# Patient Record
Sex: MALE | HISPANIC OR LATINO | URBAN - METROPOLITAN AREA
[De-identification: names, ages, dates, MRNs, and addresses within clinical notes are randomized per-mention and may not be internally consistent; named-entity substitution may affect disease eponyms.]

---

## 2022-08-15 ENCOUNTER — HOSPITAL ENCOUNTER (EMERGENCY)
Facility: HOSPITAL | Age: 37
Discharge: HOME | End: 2022-08-15
Attending: EMERGENCY MEDICINE | Admitting: EMERGENCY MEDICINE

## 2022-08-15 VITALS
SYSTOLIC BLOOD PRESSURE: 142 MMHG | TEMPERATURE: 97.5 F | HEART RATE: 76 BPM | OXYGEN SATURATION: 99 % | HEIGHT: 66 IN | RESPIRATION RATE: 15 BRPM | DIASTOLIC BLOOD PRESSURE: 99 MMHG

## 2022-08-15 DIAGNOSIS — H57.89 IRRITATION OF LEFT EYE: Primary | ICD-10-CM

## 2022-08-15 PROCEDURE — 25010000002 TETANUS-DIPHTH-ACELL PERTUSSIS 5-2.5-18.5 LF-MCG/0.5 SUSPENSION PREFILLED SYRINGE: Performed by: PHYSICIAN ASSISTANT

## 2022-08-15 PROCEDURE — 90471 IMMUNIZATION ADMIN: CPT | Performed by: PHYSICIAN ASSISTANT

## 2022-08-15 PROCEDURE — 99283 EMERGENCY DEPT VISIT LOW MDM: CPT

## 2022-08-15 PROCEDURE — 90715 TDAP VACCINE 7 YRS/> IM: CPT | Performed by: PHYSICIAN ASSISTANT

## 2022-08-15 RX ORDER — PROPARACAINE HYDROCHLORIDE 5 MG/ML
2 SOLUTION/ DROPS OPHTHALMIC ONCE
Status: COMPLETED | OUTPATIENT
Start: 2022-08-15 | End: 2022-08-15

## 2022-08-15 RX ORDER — KETOTIFEN FUMARATE 0.35 MG/ML
1 SOLUTION/ DROPS OPHTHALMIC 2 TIMES DAILY
Qty: 10 ML | Refills: 0 | Status: SHIPPED | OUTPATIENT
Start: 2022-08-15

## 2022-08-15 RX ORDER — ERYTHROMYCIN 5 MG/G
OINTMENT OPHTHALMIC
Qty: 3.5 G | Refills: 0 | Status: SHIPPED | OUTPATIENT
Start: 2022-08-15

## 2022-08-15 RX ADMIN — TETANUS TOXOID, REDUCED DIPHTHERIA TOXOID AND ACELLULAR PERTUSSIS VACCINE, ADSORBED 0.5 ML: 5; 2.5; 8; 8; 2.5 SUSPENSION INTRAMUSCULAR at 21:49

## 2022-08-15 RX ADMIN — PROPARACAINE HYDROCHLORIDE 2 DROP: 5 SOLUTION/ DROPS OPHTHALMIC at 21:49

## 2022-08-15 RX ADMIN — FLUORESCEIN SODIUM 1 STRIP: 1 STRIP OPHTHALMIC at 21:49

## 2022-08-16 NOTE — ED PROVIDER NOTES
EMERGENCY DEPARTMENT ENCOUNTER    Room Number:  A03/03  Date of encounter:  8/15/2022  PCP: Provider, No Known  Historian: Patient      HPI:  Chief Complaint: Eye pain  A complete HPI/ROS/PMH/PSH/SH/FH are unobtainable due to: N/A    Context: Bassam Manzano is a 37 y.o. male who presents to the ED c/o possible foreign body in the left eye.  Patient states that he was doing woodworking when he feels like he got sawdust in his eyes earlier this afternoon.  Patient states that his eyes feel very irritated, swollen, and is having a difficult deep holding the left eye open due to the pain.  Denies any loss of vision, no headache, sore throat, runny nose.  Patient denies any welding or metal grinding.  Does not wear glasses or contacts, does not know his last tetanus vaccination.      PAST MEDICAL HISTORY  Active Ambulatory Problems     Diagnosis Date Noted   • No Active Ambulatory Problems     Resolved Ambulatory Problems     Diagnosis Date Noted   • No Resolved Ambulatory Problems     No Additional Past Medical History         PAST SURGICAL HISTORY  No past surgical history on file.      FAMILY HISTORY  No family history on file.      SOCIAL HISTORY  Social History     Socioeconomic History   • Marital status:          ALLERGIES  Patient has no known allergies.        REVIEW OF SYSTEMS  Review of Systems     All systems reviewed and negative except for those discussed in HPI.       PHYSICAL EXAM    I have reviewed the triage vital signs and nursing notes.    ED Triage Vitals [08/15/22 2123]   Temp Heart Rate Resp BP SpO2   97.5 °F (36.4 °C) 80 16 (!) 146/105 98 %      Temp src Heart Rate Source Patient Position BP Location FiO2 (%)   Tympanic Monitor Standing Right arm --       Physical Exam  GENERAL: Alert and orient x3, not distressed  HENT: no rhinorrhea, no pharyngeal erythema or tonsillar exudate  EYES: no scleral icterus, conjunctival injection on the left without fluorescein dye uptake, corneal abrasions,  corneal ulcers, or foreign object seen  CV: regular rhythm, regular rate  RESPIRATORY: normal effort  MUSCULOSKELETAL: no deformity  NEURO: alert, moves all extremities, follows commands  SKIN: warm, dry      LAB RESULTS  No results found for this or any previous visit (from the past 24 hour(s)).    Ordered the above labs and independently reviewed the results.        RADIOLOGY  No Radiology Exams Resulted Within Past 24 Hours    I ordered the above noted radiological studies. Reviewed by me and discussed with radiologist.  See dictation for official radiology interpretation.      PROCEDURES    Procedures      MEDICATIONS GIVEN IN ER    Medications   proparacaine (ALCAINE) 0.5 % ophthalmic solution 2 drop (2 drops Both Eyes Given 8/15/22 2149)   fluorescein ophthalmic strip 1 strip (1 strip Both Eyes Given 8/15/22 2149)   Tetanus-Diphth-Acell Pertussis (BOOSTRIX) injection 0.5 mL (0.5 mL Intramuscular Given 8/15/22 2149)         PROGRESS, DATA ANALYSIS, CONSULTS, AND MEDICAL DECISION MAKING    All labs have been independently reviewed by me.  All radiology studies have been reviewed by me and discussed with radiologist dictating the report.   EKG's independently viewed and interpreted by me.  Discussion below represents my analysis of pertinent findings related to patient's condition, differential diagnosis, treatment plan and final disposition.    DDx: Includes but not limited to foreign body, corneal abrasion, corneal ulcer, eye irritation, conjunctivitis    ED Course as of 08/15/22 2226   Mon Aug 15, 2022   2226 Patient's visual acuity was 20/15 on the left and 20/20 on the right. [KIANNA]      ED Course User Index  [KIANNA] Preston Vang PA       MDM: Patient's exam reveals conjunctival injection but no traumatic findings were present including no fluorescein dye uptake to suggest an ulcer or abrasion, no hyphema or evidence of penetrating eye injury.  Patient's tetanus has been updated and he will be given eye  ointment for symptomatic treatment and ophthalmologic follow-up.    PPE: The patient wore a surgical mask throughout the entire patient encounter. I wore an N95.    AS OF 22:26 EDT VITALS:    BP - (!) 146/105  HR - 80  TEMP - 97.5 °F (36.4 °C) (Tympanic)  O2 SATS - 98%        DIAGNOSIS  Final diagnoses:   Irritation of left eye         DISPOSITION  DISCHARGE    Patient discharged in stable condition.    Reviewed implications of results, diagnosis, meds, responsibility to follow up, warning signs and symptoms of possible worsening, potential complications and reasons to return to ER.    Patient/Family voiced understanding of above instructions.    Discussed plan for discharge, as there is no emergent indication for admission. Patient referred to primary care provider for BP management due to today's BP. Pt/family is agreeable and understands need for follow up and repeat testing.  Pt is aware that discharge does not mean that nothing is wrong but it indicates no emergency is present that requires admission and they must continue care with follow-up as given below or physician of their choice.     FOLLOW-UP  Noel Causey MD  [address] [place] [address]  [phone]    Schedule an appointment as soon as possible for a visit in 3 days           Medication List      New Prescriptions    erythromycin 5 MG/GM ophthalmic ointment  Commonly known as: ROMYCIN  Administer  into the left eye Every 4 (Four) Hours While Awake.     ketotifen 0.025 % ophthalmic solution  Commonly known as: ZADITOR  Administer 1 drop into the left eye 2 (Two) Times a Day.           Where to Get Your Medications      You can get these medications from any pharmacy    Bring a paper prescription for each of these medications  · erythromycin 5 MG/GM ophthalmic ointment  · ketotifen 0.025 % ophthalmic solution                  Preston Vang PA  08/15/22 0166

## 2022-08-16 NOTE — DISCHARGE INSTRUCTIONS
Home, rest, medicine as prescribed, follow up with the eye doctor for recheck. Return to care if symptoms worsen or with further concerns.

## 2022-08-16 NOTE — ED PROVIDER NOTES
MD ATTESTATION NOTE    The ASHLEY and I have discussed this patient's history, physical exam, and treatment plan.  I have reviewed the documentation and personally had a face to face interaction with the patient. I affirm the documentation and agree with the treatment and plan.  The attached note describes my personal findings.    I provided a substantive portion of the care of this patient. I personally performed the physical exam, in its entirety.    Bassam Manzano is a 37 y.o. male who presents to the ED c/o having pain in his eye.  He reports that he was using a circular saw at his house today and thinks he got some sawdust in his left eye.  He states he tried to flush it out without improvement.  He was not wearing safety glasses.  He reports a foreign body sensation in his left eye.      On exam:  GENERAL: Awake, alert, no acute distress  SKIN: Warm, dry  HENT: Normocephalic, atraumatic  EYES: no scleral icterus  CV: regular rhythm, regular rate  RESPIRATORY: normal effort, lungs clear  ABDOMEN: soft, nontender, nondistended  MUSCULOSKELETAL: no deformity  NEURO: alert, moves all extremities, follows commands    Labs  No results found for this or any previous visit (from the past 24 hour(s)).    Radiology  No Radiology Exams Resulted Within Past 24 Hours    Medical Decision Making:  ED Course as of 08/15/22 2228   Mon Aug 15, 2022   2226 Patient's visual acuity was 20/15 on the left and 20/20 on the right. [KIANNA]      ED Course User Index  [KIANNA] Preston Vang PA       Procedures:  Procedures    We will update his tetanus status, evaluate the eye with fluorescein and tetracaine and Woods lamp.  If there is not a abrasion to the cornea, plan to irrigate his eye to remove foreign bodies.      PPE: The patient wore a mask and I wore an N95 mask throughout the entire patient encounter.      The patient qualifies to receive the vaccine, but they have not yet received it.    Diagnosis  Final diagnoses:   Irritation of  left eye        Oscar Sheth MD  08/15/22 1339

## 2022-08-16 NOTE — ED NOTES
Pt ambulatory to triage from home with c/o foreign body in eye.  Pt states was cutting wood with circular saw and thinks sawdust got into eye.  Pt tried flushing eye out with water, but states pain and will not hold eye open.  Pt wearing mask in triage.  Triage personnel wore appropriate PPE